# Patient Record
Sex: MALE | Race: WHITE | NOT HISPANIC OR LATINO | Employment: UNEMPLOYED | URBAN - METROPOLITAN AREA
[De-identification: names, ages, dates, MRNs, and addresses within clinical notes are randomized per-mention and may not be internally consistent; named-entity substitution may affect disease eponyms.]

---

## 2017-12-02 ENCOUNTER — OFFICE VISIT (OUTPATIENT)
Dept: URGENT CARE | Facility: PHYSICIAN GROUP | Age: 62
End: 2017-12-02

## 2017-12-02 ENCOUNTER — TELEPHONE (OUTPATIENT)
Dept: URGENT CARE | Facility: PHYSICIAN GROUP | Age: 62
End: 2017-12-02

## 2017-12-02 VITALS
HEIGHT: 75 IN | DIASTOLIC BLOOD PRESSURE: 92 MMHG | RESPIRATION RATE: 16 BRPM | OXYGEN SATURATION: 97 % | TEMPERATURE: 98 F | WEIGHT: 235 LBS | SYSTOLIC BLOOD PRESSURE: 128 MMHG | BODY MASS INDEX: 29.22 KG/M2 | HEART RATE: 71 BPM

## 2017-12-02 DIAGNOSIS — J45.20 MILD INTERMITTENT ASTHMA WITHOUT COMPLICATION: ICD-10-CM

## 2017-12-02 PROCEDURE — 99213 OFFICE O/P EST LOW 20 MIN: CPT | Performed by: EMERGENCY MEDICINE

## 2017-12-02 RX ORDER — ALBUTEROL SULFATE 90 UG/1
2 AEROSOL, METERED RESPIRATORY (INHALATION) EVERY 6 HOURS PRN
Qty: 8.5 G | Refills: 1 | Status: SHIPPED | OUTPATIENT
Start: 2017-12-02 | End: 2018-08-15

## 2017-12-02 ASSESSMENT — ENCOUNTER SYMPTOMS
FEVER: 0
TINGLING: 0
NAUSEA: 0
EYE REDNESS: 1
EYE DISCHARGE: 1
SENSORY CHANGE: 0
SHORTNESS OF BREATH: 1
WHEEZING: 1
NECK PAIN: 0
BACK PAIN: 0
CHILLS: 0
ABDOMINAL PAIN: 0
TREMORS: 0
SPEECH CHANGE: 0
VOMITING: 0

## 2017-12-03 NOTE — PROGRESS NOTES
Subjective:      Bakari Webb is a 62 y.o. male who presents with Medication Refill (needs refills for ventolin and dulera inhalers)            HPI  Pt here for a refill of breathing medicine, needs albuterol, dulera refills, Has no current problems with breathengbut using the albuterol, Pt is a non smoker as of now, stopped 12/30/16. Also stopped drinking 8/16.    Allergies   Allergen Reactions   • Nkda [No Known Drug Allergy]      Social History     Social History   • Marital status:      Spouse name: N/A   • Number of children: N/A   • Years of education: N/A     Occupational History   •       Social History Main Topics   • Smoking status: Former Smoker     Packs/day: 1.50     Years: 25.00     Types: Cigarettes     Quit date: 8/1/2015   • Smokeless tobacco: Never Used      Comment: quit 6 years once.    • Alcohol use No      Comment: stopped drinking August 2015   • Drug use: No   • Sexual activity: Not Currently      Comment:      Other Topics Concern   • Not on file     Social History Narrative   • No narrative on file   .........................................................  Past Medical History:   Diagnosis Date   • ASTHMA    • Current smoker 6/28/2015    He resumed smoking after discharge.    • EMPHYSEMA 2004   • Erectile dysfunction 2009   • GERD (gastroesophageal reflux disease)    • Hyperlipidemia    • Hypertension      Current Outpatient Prescriptions on File Prior to Visit   Medication Sig Dispense Refill   • aspirin (ASA) 81 MG Chew Tab chewable tablet Take 81 mg by mouth every day.     • amlodipine (NORVASC) 10 MG Tab Take 10 mg by mouth every day.     • ipratropium-albuterol (DUONEB) 0.5-2.5 (3) MG/3ML nebulizer solution 3 mL by Nebulization route every four hours as needed for Shortness of Breath.     • lisinopril (PRINIVIL) 10 MG Tab Take 10 mg by mouth every day.     • montelukast (SINGULAIR) 10 MG Tab Take 10 mg by mouth every day.     • albuterol (PROAIR HFA) 108 (90 BASE)  "MCG/ACT Aero Soln inhalation aerosol Inhale 1-2 Puffs by mouth every 6 hours as needed for Shortness of Breath. REQUIRES APPOINTMENT FOR ADDITIONAL MEDICATION 8.5 g 0     No current facility-administered medications on file prior to visit.      Family History   Problem Relation Age of Onset   • Cancer Mother      breast   • Lung Disease Mother      asthma   • Alcohol/Drug Mother    • Diabetes Maternal Grandmother    • Cancer Maternal Grandfather      lung   • Stroke Paternal Grandfather    • Heart Disease Neg Hx    • Hypertension Neg Hx    • Hyperlipidemia Neg Hx      Review of Systems   Constitutional: Negative for chills and fever.   HENT: Negative for hearing loss.    Eyes: Positive for discharge and redness.   Respiratory: Positive for shortness of breath and wheezing.         . He is currently out of the Dulera, has been using his albuterol.   Cardiovascular: Positive for chest pain.   Gastrointestinal: Negative for abdominal pain, nausea and vomiting.   Musculoskeletal: Negative for back pain and neck pain.   Skin: Negative for rash.   Neurological: Negative for tingling, tremors, sensory change and speech change.          Objective:     /92   Pulse 71   Temp 36.7 °C (98 °F)   Resp 16   Ht 1.905 m (6' 3\")   Wt 106.6 kg (235 lb)   SpO2 97%   BMI 29.37 kg/m²      Physical Exam   Constitutional: He is oriented to person, place, and time. He appears well-developed and well-nourished. No distress.   HENT:   Head: Atraumatic.   Right Ear: External ear normal.   Left Ear: External ear normal.   Eyes: Conjunctivae are normal. Right eye exhibits no discharge. Left eye exhibits no discharge.   Neck: Normal range of motion.   Cardiovascular: Normal rate and regular rhythm.    Pulmonary/Chest: Effort normal and breath sounds normal. No respiratory distress.   Pulse oximetry 97%.   Abdominal: He exhibits no distension.   Neurological: He is oriented to person, place, and time.   Skin: Skin is warm. He is not " diaphoretic. No erythema.   Psychiatric: He has a normal mood and affect. His behavior is normal.   Nursing note and vitals reviewed.              Assessment/Plan:     Medication refills                       Asthma  Pt given Albuterol and Dulera with refills.            I am recommending the patient initiate/ continue hydration efforts including the use of a vaporizer/humidifier/ netti pot. I also recommend the pt, initiate Mucinex . In addition the patient will initiate the prescribed prescription medication/s: Albuterol as well as Dulera with refills. If the patient's condition exacerbates with worsening dysphagia, shortness of breath, uncontrolled fever, headache or chest pressure he/she will return immediately to the urgent care or go to  the emergency department for further evaluation.    Keyur Correia

## 2018-08-15 ENCOUNTER — APPOINTMENT (OUTPATIENT)
Dept: RADIOLOGY | Facility: MEDICAL CENTER | Age: 63
End: 2018-08-15
Attending: EMERGENCY MEDICINE
Payer: MEDICAID

## 2018-08-15 ENCOUNTER — HOSPITAL ENCOUNTER (EMERGENCY)
Facility: MEDICAL CENTER | Age: 63
End: 2018-08-15
Attending: EMERGENCY MEDICINE
Payer: MEDICAID

## 2018-08-15 VITALS
BODY MASS INDEX: 33.44 KG/M2 | OXYGEN SATURATION: 92 % | TEMPERATURE: 98.6 F | RESPIRATION RATE: 20 BRPM | HEIGHT: 75 IN | WEIGHT: 268.96 LBS | DIASTOLIC BLOOD PRESSURE: 111 MMHG | SYSTOLIC BLOOD PRESSURE: 184 MMHG | HEART RATE: 100 BPM

## 2018-08-15 DIAGNOSIS — I10 HYPERTENSION, UNSPECIFIED TYPE: ICD-10-CM

## 2018-08-15 DIAGNOSIS — F10.930 ALCOHOL WITHDRAWAL SYNDROME WITHOUT COMPLICATION (HCC): ICD-10-CM

## 2018-08-15 LAB
ALBUMIN SERPL BCP-MCNC: 4.4 G/DL (ref 3.2–4.9)
ALBUMIN/GLOB SERPL: 1.2 G/DL
ALP SERPL-CCNC: 61 U/L (ref 30–99)
ALT SERPL-CCNC: 47 U/L (ref 2–50)
ANION GAP SERPL CALC-SCNC: 12 MMOL/L (ref 0–11.9)
AST SERPL-CCNC: 38 U/L (ref 12–45)
BASOPHILS # BLD AUTO: 0.3 % (ref 0–1.8)
BASOPHILS # BLD: 0.03 K/UL (ref 0–0.12)
BILIRUB SERPL-MCNC: 1.1 MG/DL (ref 0.1–1.5)
BUN SERPL-MCNC: 16 MG/DL (ref 8–22)
CALCIUM SERPL-MCNC: 9 MG/DL (ref 8.4–10.2)
CHLORIDE SERPL-SCNC: 106 MMOL/L (ref 96–112)
CO2 SERPL-SCNC: 21 MMOL/L (ref 20–33)
CREAT SERPL-MCNC: 0.95 MG/DL (ref 0.5–1.4)
EKG IMPRESSION: NORMAL
EOSINOPHIL # BLD AUTO: 0.03 K/UL (ref 0–0.51)
EOSINOPHIL NFR BLD: 0.3 % (ref 0–6.9)
ERYTHROCYTE [DISTWIDTH] IN BLOOD BY AUTOMATED COUNT: 56.4 FL (ref 35.9–50)
GLOBULIN SER CALC-MCNC: 3.7 G/DL (ref 1.9–3.5)
GLUCOSE SERPL-MCNC: 88 MG/DL (ref 65–99)
HCT VFR BLD AUTO: 47.4 % (ref 42–52)
HGB BLD-MCNC: 15.9 G/DL (ref 14–18)
IMM GRANULOCYTES # BLD AUTO: 0.04 K/UL (ref 0–0.11)
IMM GRANULOCYTES NFR BLD AUTO: 0.4 % (ref 0–0.9)
LYMPHOCYTES # BLD AUTO: 1.24 K/UL (ref 1–4.8)
LYMPHOCYTES NFR BLD: 13.2 % (ref 22–41)
MCH RBC QN AUTO: 34 PG (ref 27–33)
MCHC RBC AUTO-ENTMCNC: 33.5 G/DL (ref 33.7–35.3)
MCV RBC AUTO: 101.5 FL (ref 81.4–97.8)
MONOCYTES # BLD AUTO: 0.66 K/UL (ref 0–0.85)
MONOCYTES NFR BLD AUTO: 7 % (ref 0–13.4)
NEUTROPHILS # BLD AUTO: 7.41 K/UL (ref 1.82–7.42)
NEUTROPHILS NFR BLD: 78.8 % (ref 44–72)
NRBC # BLD AUTO: 0 K/UL
NRBC BLD-RTO: 0 /100 WBC
PLATELET # BLD AUTO: 199 K/UL (ref 164–446)
PMV BLD AUTO: 10.7 FL (ref 9–12.9)
POTASSIUM SERPL-SCNC: 4.1 MMOL/L (ref 3.6–5.5)
PROT SERPL-MCNC: 8.1 G/DL (ref 6–8.2)
RBC # BLD AUTO: 4.67 M/UL (ref 4.7–6.1)
SODIUM SERPL-SCNC: 139 MMOL/L (ref 135–145)
TROPONIN I SERPL-MCNC: <0.02 NG/ML (ref 0–0.04)
WBC # BLD AUTO: 9.4 K/UL (ref 4.8–10.8)

## 2018-08-15 PROCEDURE — 94760 N-INVAS EAR/PLS OXIMETRY 1: CPT

## 2018-08-15 PROCEDURE — 93005 ELECTROCARDIOGRAM TRACING: CPT

## 2018-08-15 PROCEDURE — 94640 AIRWAY INHALATION TREATMENT: CPT

## 2018-08-15 PROCEDURE — 85025 COMPLETE CBC W/AUTO DIFF WBC: CPT

## 2018-08-15 PROCEDURE — A9270 NON-COVERED ITEM OR SERVICE: HCPCS | Performed by: EMERGENCY MEDICINE

## 2018-08-15 PROCEDURE — 700101 HCHG RX REV CODE 250: Performed by: EMERGENCY MEDICINE

## 2018-08-15 PROCEDURE — 84484 ASSAY OF TROPONIN QUANT: CPT

## 2018-08-15 PROCEDURE — 36415 COLL VENOUS BLD VENIPUNCTURE: CPT

## 2018-08-15 PROCEDURE — 700102 HCHG RX REV CODE 250 W/ 637 OVERRIDE(OP): Performed by: EMERGENCY MEDICINE

## 2018-08-15 PROCEDURE — 99284 EMERGENCY DEPT VISIT MOD MDM: CPT

## 2018-08-15 PROCEDURE — 93005 ELECTROCARDIOGRAM TRACING: CPT | Performed by: EMERGENCY MEDICINE

## 2018-08-15 PROCEDURE — 71045 X-RAY EXAM CHEST 1 VIEW: CPT

## 2018-08-15 PROCEDURE — 80053 COMPREHEN METABOLIC PANEL: CPT

## 2018-08-15 RX ORDER — IPRATROPIUM BROMIDE AND ALBUTEROL SULFATE 2.5; .5 MG/3ML; MG/3ML
3 SOLUTION RESPIRATORY (INHALATION)
Status: COMPLETED | OUTPATIENT
Start: 2018-08-15 | End: 2018-08-15

## 2018-08-15 RX ORDER — CHLORDIAZEPOXIDE HYDROCHLORIDE 25 MG/1
75 CAPSULE, GELATIN COATED ORAL ONCE
Status: COMPLETED | OUTPATIENT
Start: 2018-08-15 | End: 2018-08-15

## 2018-08-15 RX ADMIN — CHLORDIAZEPOXIDE HYDROCHLORIDE 75 MG: 25 CAPSULE ORAL at 14:09

## 2018-08-15 RX ADMIN — IPRATROPIUM BROMIDE AND ALBUTEROL SULFATE 3 ML: .5; 3 SOLUTION RESPIRATORY (INHALATION) at 14:08

## 2018-08-15 ASSESSMENT — COPD QUESTIONNAIRES
HAVE YOU SMOKED AT LEAST 100 CIGARETTES IN YOUR ENTIRE LIFE: YES
DO YOU EVER COUGH UP ANY MUCUS OR PHLEGM?: YES, EVERY DAY
COPD SCREENING SCORE: 8
DURING THE PAST 4 WEEKS HOW MUCH DID YOU FEEL SHORT OF BREATH: MOST  OR ALL OF THE TIME

## 2018-08-15 ASSESSMENT — PAIN SCALES - GENERAL
PAINLEVEL_OUTOF10: 3
PAINLEVEL_OUTOF10: 4

## 2018-08-15 ASSESSMENT — LIFESTYLE VARIABLES: EVER_SMOKED: YES

## 2018-08-15 NOTE — ED NOTES
Pt given discharge paperwork, pt verbalized understanding of all information given, Pt ambulated out of ER.

## 2018-08-15 NOTE — ED NOTES
Spoke with Jillian HENRY from [facility], updated on pt status with permission of pt. She would like updates on status, planning to have pt detox at [facility] if medically cleared.

## 2018-08-15 NOTE — FLOWSHEET NOTE
08/15/18 1412   Events/Summary/Plan   Events/Summary/Plan SVN given in ER   Interdisciplinary Plan of Care-Goals (Indications)   Bronchodilator Indications History / Diagnosis   Interdisciplinary Plan of Care-Outcomes    Bronchodilator Outcome Improved Vital Signs and Measures of Gas Exchange;Patient at Stable Baseline   Education   Education Yes - Pt. / Family has been Instructed in use of Respiratory Equipment;Yes - Pt. / Family has been Instructed in use of Respiratory Medications and Adverse Reactions   SVN Group   #SVN Performed Yes   Given By: Mouthpiece   Date SVN Last Changed 08/15/18   Date SVN Next Change Due (Q 7 Days) 08/22/18   Respiratory WDL   Respiratory (WDL) X   Chest Exam   Work Of Breathing / Effort Mild   Respiration 20  (post 20)   Pulse (!) 107  (post 114)   Heart Rate (Monitored) (!) 107   Breath Sounds   Pre/Post Intervention Pre Intervention Assessment  (increased aeration following svn, still hear crackles )   RUL Breath Sounds Coarse Crackles   RML Breath Sounds Coarse Crackles   RLL Breath Sounds Crackles   MIHIR Breath Sounds Coarse Crackles   LLL Breath Sounds Crackles   Oximetry   #Pulse Oximetry (Single Determination) Yes   Oxygen   Home O2 Use Prior To Admission? No   Pulse Oximetry 91 %   O2 (LPM) 0   O2 Daily Delivery Respiratory  Room Air with O2 Available

## 2018-08-15 NOTE — ED NOTES
Med rec updated and complete  Allergies reviewed  Pt reports no vitamins or OTC's.  Pt reports no antibiotics in the last 30 days.

## 2018-08-15 NOTE — ED NOTES
Pt ok to go back to [facility], will place a call out to confirm bed placement for pt.
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
## 2018-08-15 NOTE — ED NOTES
Pt assessed, call light within reach. Will cont to monitor    Pt low risk for falls, no interventions needed

## 2018-08-15 NOTE — ED NOTES
"Chief Complaint   Patient presents with   • ETOH Withdrawal     Patient sent by [facility] for medical clearance. Pt wants to detox. Drinks 1 pint Geno per day, last drink was last night. Pt reports left arm and back pain intermittently for past month, so was sent here prior to admission at [facility]. Pt denies any s/s detox at this time. Denies CP, reports SOB hx asthma      BP (!) 184/111   Pulse (!) 113   Temp 37 °C (98.6 °F)   Resp 20   Ht 1.905 m (6' 3\")   Wt 122 kg (268 lb 15.4 oz)   SpO2 92%   BMI 33.62 kg/m²     "

## 2018-08-16 ENCOUNTER — PATIENT OUTREACH (OUTPATIENT)
Dept: HEALTH INFORMATION MANAGEMENT | Facility: OTHER | Age: 63
End: 2018-08-16

## 2018-08-16 NOTE — PROGRESS NOTES
Chart reviewed.  Pt was discharged from [facility] ER to [facility] 8/15/18 and does not qualify for discharge outreach phone call.

## 2021-03-03 DIAGNOSIS — Z23 NEED FOR VACCINATION: ICD-10-CM
